# Patient Record
Sex: MALE
[De-identification: names, ages, dates, MRNs, and addresses within clinical notes are randomized per-mention and may not be internally consistent; named-entity substitution may affect disease eponyms.]

---

## 2018-06-11 PROBLEM — Z00.00 ENCOUNTER FOR PREVENTIVE HEALTH EXAMINATION: Status: ACTIVE | Noted: 2018-06-11

## 2018-07-06 ENCOUNTER — APPOINTMENT (OUTPATIENT)
Dept: NEUROLOGY | Facility: CLINIC | Age: 38
End: 2018-07-06
Payer: COMMERCIAL

## 2018-07-06 VITALS
SYSTOLIC BLOOD PRESSURE: 157 MMHG | WEIGHT: 208 LBS | DIASTOLIC BLOOD PRESSURE: 94 MMHG | HEART RATE: 63 BPM | HEIGHT: 69 IN | BODY MASS INDEX: 30.81 KG/M2

## 2018-07-06 DIAGNOSIS — Z83.3 FAMILY HISTORY OF DIABETES MELLITUS: ICD-10-CM

## 2018-07-06 PROCEDURE — 99205 OFFICE O/P NEW HI 60 MIN: CPT

## 2018-07-06 RX ORDER — LEVETIRACETAM 250 MG/1
250 TABLET, FILM COATED ORAL
Qty: 30 | Refills: 0 | Status: COMPLETED | COMMUNITY
Start: 2018-06-15

## 2018-07-06 RX ORDER — LEVETIRACETAM 500 MG/1
500 TABLET, FILM COATED ORAL
Qty: 60 | Refills: 0 | Status: COMPLETED | COMMUNITY
Start: 2018-06-29

## 2018-08-10 ENCOUNTER — APPOINTMENT (OUTPATIENT)
Dept: NEUROLOGY | Facility: CLINIC | Age: 38
End: 2018-08-10
Payer: COMMERCIAL

## 2018-08-10 ENCOUNTER — OTHER (OUTPATIENT)
Age: 38
End: 2018-08-10

## 2018-08-10 PROCEDURE — 95819 EEG AWAKE AND ASLEEP: CPT

## 2018-08-11 PROCEDURE — 95953: CPT

## 2018-08-12 PROCEDURE — 95953: CPT

## 2018-08-14 ENCOUNTER — OTHER (OUTPATIENT)
Age: 38
End: 2018-08-14

## 2018-08-31 ENCOUNTER — APPOINTMENT (OUTPATIENT)
Dept: NEUROLOGY | Facility: CLINIC | Age: 38
End: 2018-08-31
Payer: COMMERCIAL

## 2018-08-31 VITALS
BODY MASS INDEX: 30.81 KG/M2 | HEART RATE: 70 BPM | SYSTOLIC BLOOD PRESSURE: 150 MMHG | WEIGHT: 208 LBS | HEIGHT: 69 IN | DIASTOLIC BLOOD PRESSURE: 91 MMHG

## 2018-08-31 PROCEDURE — 99214 OFFICE O/P EST MOD 30 MIN: CPT

## 2019-02-15 ENCOUNTER — APPOINTMENT (OUTPATIENT)
Dept: NEUROLOGY | Facility: CLINIC | Age: 39
End: 2019-02-15
Payer: COMMERCIAL

## 2019-02-15 VITALS
HEART RATE: 71 BPM | HEIGHT: 69 IN | SYSTOLIC BLOOD PRESSURE: 164 MMHG | BODY MASS INDEX: 30.81 KG/M2 | DIASTOLIC BLOOD PRESSURE: 103 MMHG | WEIGHT: 208 LBS

## 2019-02-15 PROCEDURE — 99214 OFFICE O/P EST MOD 30 MIN: CPT

## 2019-02-15 NOTE — PHYSICAL EXAM
[FreeTextEntry1] : alert and oriented x 3, speech fluent, names easily, follows requests, good recall for recent and remote events.\par EOM full without sustained nystagmus, PERRL, face symmetrical, no dysarthria\par Motor - full strength in all extremities. normal rapid-alternating movements.\par Sensory - intact LT bilaterally\par Coord - no tremor, ataxia\par Gait - stands without difficulty, normal gait.

## 2019-02-15 NOTE — ASSESSMENT
[FreeTextEntry1] : Mr. NAVA presents with one clear convulsion from [place] 10, and one very likely convulsion (bitten tongue, bruised back and shoulder) from Jan 31.  EEG showed L temporal slowing. MRI was unremarkable.  Based on age - primary generalized seizure is unlikely, the occurrence of seizure out of sleep or possibly early morning raises possibilty of frontal lobe epilepsy. Mr. NAVA does not drive at present - he lives in [facility] and does not use car. He and his wife travel from [place] on [address] for appointment.\par Plan:\par \par 1. continue levetiracetam 500 q12.  if seizure/aura free at next visit, will decrease dose to 250 q12. We discussed that seizure recurrence risk may be approximately 50% in 2 years. (47% on epilepsypredictiontools.info)\par 2. lorazepam 1mg po as needed before or during airplane flights (traveling to [place] in late August)\par 3. RTC 6 mo - sooner if levetiracetam side effects or recurrent aura or seizure.\par \par Greater than 50% of the encounter time was spent on counseling and coordination of care for reviewing records in [employer], discussion with patient regarding plan. I have spent 25 minutes of face to face time with the patient reviewing the cause of seizures or seizure-like events, assessing the risk of recurrence, educating the patient or family to recognize seizures, and discussing possible treatment options and possible side effects of seizure medications. I also discussed seizure safety, and ways of reducing seizure risk.\par \par

## 2019-02-15 NOTE — HISTORY OF PRESENT ILLNESS
[FreeTextEntry1] : *** 02/15/2019 ***\par Mr. ENRIQUE NAVA returns for scheduled follow-up appointment. Mr. NAVA reports that in the interval since his last visit, he is doing well. No interval seizures or auras.  Mr. NAVA endorses minimal irritability - not clearly different than baseline.  Mr. NAVA also endorses that he has been more physically active and wondered whether that would impact on seizure risk. \par \par *** 08/31/2018 *** \par On this visit, Mr. NAVA recalls that in June prior to seizure he had 4-5 episodes of 1-3 minutes of feeling dizzy, gesturing to stomach. Since last visit, Mr. NAVA has had no recurrent events. He continues to take levetiracetam 500 q12, spouse has noted minimal irritability, mostly when already frustrated. No change in mood. \par \par *** 7/6/2018 ***\par On 6/10/18 Mr. NAVA had a seizure out of sleep, had w/u at [facility]. Spouse awoke to Mr. NAVA screaming and stiffened - convulsing.  May have had another event on Jan 31- woke up with "black tongue" and bruising on back. It was painful, and had difficulty eating.  Felt like he was "out of it" at the appointment. Spoke to his wife and told her there was  lapse of time and he was confused at doctor appointment.  Not sleep deprived, may have had a few beers over the prior weekend. \par \par No febrile seizures, no CNS infections. Normal development and schooling.\par Normal pregnancy.  \par \par Meds - took levetiracetam 500 mg twice a day. \par \par PMH - borderline HTN\par PSH - none\par Social - master's in special ed, no tobb, occasional ETOH, no drugs. \par FH - nc\par ROS - none

## 2019-06-06 ENCOUNTER — MEDICATION RENEWAL (OUTPATIENT)
Age: 39
End: 2019-06-06

## 2019-06-06 RX ORDER — LEVETIRACETAM 500 MG/1
500 TABLET, FILM COATED ORAL TWICE DAILY
Qty: 180 | Refills: 1 | Status: DISCONTINUED | COMMUNITY
Start: 1900-01-01 | End: 2019-06-06

## 2019-09-09 ENCOUNTER — APPOINTMENT (OUTPATIENT)
Dept: NEUROLOGY | Facility: CLINIC | Age: 39
End: 2019-09-09
Payer: COMMERCIAL

## 2019-09-09 VITALS
HEIGHT: 70 IN | WEIGHT: 208 LBS | SYSTOLIC BLOOD PRESSURE: 155 MMHG | HEART RATE: 67 BPM | BODY MASS INDEX: 29.78 KG/M2 | DIASTOLIC BLOOD PRESSURE: 91 MMHG

## 2019-09-09 PROCEDURE — 99214 OFFICE O/P EST MOD 30 MIN: CPT

## 2019-09-09 NOTE — HISTORY OF PRESENT ILLNESS
[FreeTextEntry1] : *** 09/09/2019  ***\par Mr. NAVA returns for follow-up.  He has been taking levetiracetam 250 q12 for nearly 3 mo without recurrence of seizures.  He has one lifetime convulsion. However, day prior to convulsion he had multiple episodes of dizziness and GI upset lasting 1-3 minutes each. Mr. NAVA has not had recurrence of these symptoms since taking levetiracetam. He now returns with his spouse to discuss pros / cons \par \par *** 02/15/2019 ***\par Mr. ENRIQUE NAVA returns for scheduled follow-up appointment. Mr. NAVA reports that in the interval since his last visit, he is doing well. No interval seizures or auras.  Mr. NAVA endorses minimal irritability - not clearly different than baseline.  Mr. NAVA also endorses that he has been more physically active and wondered whether that would impact on seizure risk. \par \par *** 08/31/2018 *** \par On this visit, Mr. NAVA recalls that in June prior to seizure he had 4-5 episodes of 1-3 minutes of feeling dizzy, gesturing to stomach. Since last visit, Mr. NAVA has had no recurrent events. He continues to take levetiracetam 500 q12, spouse has noted minimal irritability, mostly when already frustrated. No change in mood. \par \par *** 7/6/2018 ***\par On 6/10/18 Mr. NAVA had a seizure out of sleep, had w/u at [facility] in [place]. Spouse awoke to Mr. NAVA screaming and stiffened - convulsing.  May have had another event on Jan 31- woke up with "black tongue" and bruising on back. It was painful, and had difficulty eating.  Felt like he was "out of it" at the appointment. Spoke to his wife and told her there was  lapse of time and he was confused at doctor appointment.  Not sleep deprived, may have had a few beers over the prior weekend. \par \par No febrile seizures, no CNS infections. Normal development and schooling.\par Normal pregnancy.  \par \par Meds - took levetiracetam 500 mg twice a day. \par \par PMH - borderline HTN\par PSH - none\par Social - master's in special ed, no tobb, occasional ETOH, no drugs. \par FH - nc\par ROS - none

## 2019-09-26 ENCOUNTER — MEDICATION RENEWAL (OUTPATIENT)
Age: 39
End: 2019-09-26

## 2019-10-11 ENCOUNTER — APPOINTMENT (OUTPATIENT)
Dept: NEUROLOGY | Facility: CLINIC | Age: 39
End: 2019-10-11
Payer: COMMERCIAL

## 2019-10-11 PROCEDURE — 95816 EEG AWAKE AND DROWSY: CPT

## 2019-10-12 PROCEDURE — 95953: CPT

## 2019-10-13 PROCEDURE — 95953: CPT

## 2019-10-17 ENCOUNTER — OTHER (OUTPATIENT)
Age: 39
End: 2019-10-17

## 2020-10-16 ENCOUNTER — APPOINTMENT (OUTPATIENT)
Dept: NEUROLOGY | Facility: CLINIC | Age: 40
End: 2020-10-16
Payer: COMMERCIAL

## 2020-10-16 VITALS
SYSTOLIC BLOOD PRESSURE: 170 MMHG | BODY MASS INDEX: 28.49 KG/M2 | HEIGHT: 70 IN | DIASTOLIC BLOOD PRESSURE: 99 MMHG | HEART RATE: 59 BPM | WEIGHT: 199 LBS

## 2020-10-16 VITALS — TEMPERATURE: 97.1 F

## 2020-10-16 PROCEDURE — 99214 OFFICE O/P EST MOD 30 MIN: CPT

## 2020-11-01 RX ORDER — LEVETIRACETAM 250 MG/1
250 TABLET, FILM COATED ORAL TWICE DAILY
Qty: 60 | Refills: 5 | Status: DISCONTINUED | COMMUNITY
Start: 2019-06-06 | End: 2020-11-01

## 2020-11-01 RX ORDER — LORAZEPAM 1 MG/1
1 TABLET ORAL
Qty: 12 | Refills: 0 | Status: DISCONTINUED | COMMUNITY
Start: 2018-07-06 | End: 2020-11-01

## 2020-11-01 NOTE — ASSESSMENT
[FreeTextEntry1] : Mr. NAVA presents following recent recurrent convulsion from sleep.  Prior to this he had one clear convulsion from [place] 10 2019, and one very likely convulsion (bitten tongue, bruised back and shoulder) from Jan 31 2019.  EEG showed L temporal slowing. MRI was unremarkable.  Based on age - primary generalized seizure is unlikely, the occurrence of seizure out of sleep or possibly early morning raises possibilty of frontal lobe epilepsy. Mr. NAVA does not drive - he lives in [place] and does not use car. \par \par Plan:\par 1. re-start levetiracetam 500 q12 - Mr. NAVA was counselled on possible side effects including risk for irritability and depression. \par 2. f/u televisit in 6-12 mo\par \par I have spent 25 minutes of face to face time with the patient reviewing the cause of seizures or seizure-like events, assessing the risk of recurrence, educating the patient or family to recognize seizures, and discussing possible treatment options and possible side effects of seizure medications. I also discussed seizure safety, and ways of reducing seizure risk. Greater than 50% of the encounter time was spent on counseling and coordination of care for reviewing records in [employer], discussion with patient regarding plan.

## 2020-11-01 NOTE — HISTORY OF PRESENT ILLNESS
[FreeTextEntry1] : *** 10/16/2020  ***\par \par Mr. NAVA reports that he had an overnight convulsion - he had tapered off levetiracetam almost a year ago.  Mr. NAVA reports that he has been under increased stress and sleeping poorly.  He was re-started on levetiracetam 500 q12, and reports that he is tolerating it well. \par  \par *** 09/09/2019  ***\par Mr. NAVA returns for follow-up.  He has been taking levetiracetam 250 q12 for nearly 3 mo without recurrence of seizures.  He has one lifetime convulsion. However, day prior to convulsion he had multiple episodes of dizziness and GI upset lasting 1-3 minutes each. Mr. NAVA has not had recurrence of these symptoms since taking levetiracetam. He now returns with his spouse to discuss pros / cons \par \par *** 02/15/2019 ***\par Mr. ENRIQUE NAVA returns for scheduled follow-up appointment. Mr. NAVA reports that in the interval since his last visit, he is doing well. No interval seizures or auras.  Mr. NAVA endorses minimal irritability - not clearly different than baseline.  Mr. NAVA also endorses that he has been more physically active and wondered whether that would impact on seizure risk. \par \par *** 08/31/2018 *** \par On this visit, Mr. NAVA recalls that in June prior to seizure he had 4-5 episodes of 1-3 minutes of feeling dizzy, gesturing to stomach. Since last visit, Mr. NAVA has had no recurrent events. He continues to take levetiracetam 500 q12, spouse has noted minimal irritability, mostly when already frustrated. No change in mood. \par \par *** 7/6/2018 ***\par On 6/10/18 Mr. NAVA had a seizure out of sleep, had w/u at [facility] in [place]. Spouse awoke to Mr. NAVA screaming and stiffened - convulsing.  May have had another event on Jan 31- woke up with "black tongue" and bruising on back. It was painful, and had difficulty eating.  Felt like he was "out of it" at the appointment. Spoke to his wife and told her there was  lapse of time and he was confused at doctor appointment.  Not sleep deprived, may have had a few beers over the prior weekend. \par \par No febrile seizures, no CNS infections. Normal development and schooling.\par Normal pregnancy.  \par \par Meds - took levetiracetam 500 mg twice a day. \par \par PMH - borderline HTN\par PSH - none\par Social - master's in special ed, no tobb, occasional ETOH, no drugs. \par FH - nc\par ROS - none

## 2020-12-29 ENCOUNTER — NON-APPOINTMENT (OUTPATIENT)
Age: 40
End: 2020-12-29

## 2020-12-29 ENCOUNTER — TRANSCRIPTION ENCOUNTER (OUTPATIENT)
Age: 40
End: 2020-12-29

## 2021-01-15 ENCOUNTER — APPOINTMENT (OUTPATIENT)
Dept: NEUROLOGY | Facility: CLINIC | Age: 41
End: 2021-01-15
Payer: COMMERCIAL

## 2021-01-15 PROCEDURE — 99072 ADDL SUPL MATRL&STAF TM PHE: CPT

## 2021-01-15 PROCEDURE — 95819 EEG AWAKE AND ASLEEP: CPT

## 2021-01-16 PROCEDURE — 95708 EEG WO VID EA 12-26HR UNMNTR: CPT

## 2021-01-17 PROCEDURE — 95708 EEG WO VID EA 12-26HR UNMNTR: CPT

## 2021-01-17 PROCEDURE — 95721 EEG PHY/QHP>36<60 HR W/O VID: CPT

## 2021-01-17 PROCEDURE — 95700 EEG CONT REC W/VID EEG TECH: CPT

## 2021-01-17 PROCEDURE — 99072 ADDL SUPL MATRL&STAF TM PHE: CPT

## 2021-01-19 ENCOUNTER — APPOINTMENT (OUTPATIENT)
Dept: NEUROLOGY | Facility: CLINIC | Age: 41
End: 2021-01-19

## 2021-02-01 ENCOUNTER — APPOINTMENT (OUTPATIENT)
Dept: NEUROLOGY | Facility: CLINIC | Age: 41
End: 2021-02-01
Payer: COMMERCIAL

## 2021-02-01 DIAGNOSIS — G40.919 EPILEPSY, UNSPECIFIED, INTRACTABLE, W/OUT STATUS EPILEPTICUS: ICD-10-CM

## 2021-02-01 PROCEDURE — 99215 OFFICE O/P EST HI 40 MIN: CPT | Mod: 95

## 2021-02-01 RX ORDER — SODIUM FLUORIDE 6 MG/ML
1.1 PASTE, DENTIFRICE DENTAL
Qty: 100 | Refills: 0 | Status: ACTIVE | COMMUNITY
Start: 2020-12-12

## 2021-02-01 NOTE — ASSESSMENT
[FreeTextEntry1] : Mr. NAVA presents following recent recurrent convulsion from sleep while on levetiracetam.  Prior to this he had a convulsion in October in the setting of sleep deprivation after nearly 1 year seizure-free.  Prior to this, he had one clear convulsion from [place] 10 2019, and one very likely convulsion (bitten tongue, bruised back and shoulder) from Jan 31 2019.  EEG showed L temporal slowing. MRI was unremarkable.  Based on age - primary generalized seizure is unlikely, the occurrence of seizure out of sleep or possibly early morning raises possibility of frontal lobe epilepsy. Mr. NAVA does not drive - he lives in [place] and does not use car. \par \par Plan:\par 1.  After last convulsion, levetiracetam was changed to extended release 500 mg twice a day.\par 2.  I am concerned that Mr. Nava is having medication related toxicity, impacting irritability and mood.  We discussed changing medication, possibly to Vimpat or oxcarbazepine, depending on insurance coverage.  Mr. Nava is reluctant to make further changes, and we agreed to wait 1 month to assess side effects again.\par 3.  Follow-up televisit in 1 month to reassess levetiracetam side effects and consider alternative anticonvulsant medication.  Mr. Nava's wife is expecting a child in the summer, and I would like to make any medication changes in advance of that event\par 4.  Mr. NAVA requests copy of labs recently drawn. \par \par I have spent 40 minutes or longer reviewing patient data or discussing with the patient  the cause of seizures or seizure-like events and comorbid conditions, assessing the risk of recurrence, educating the patient or family to recognize seizures, and discussing possible treatment options for seizures and comorbid conditions and possible side effects of medications. I also discussed seizure safety, and ways of reducing seizure risk. Greater than 50% of the encounter time was spent on counseling and coordination of care for reviewing records in [employer], discussion with patient regarding plan.

## 2021-02-01 NOTE — REASON FOR VISIT
[Home] : at home, [unfilled] , at the time of the visit. [Other Location: e.g. Home (Enter Location, City,State)___] : at [unfilled] [Follow-Up: _____] : a [unfilled] follow-up visit [Spouse] : spouse

## 2021-02-01 NOTE — PHYSICAL EXAM
[FreeTextEntry1] : Alert and oriented x 3, speech fluent, names easily, follows requests, good recall for recent and remote events.\par EOM full without sustained nystagmus, PERRL, face symmetrical, no dysarthria\par Motor - symmetric strength. normal rapid-alternating movements.\par Sensory - intact LT bilaterally\par Coord - no tremor, ataxia\par Gait - stands without difficulty, normal gait.

## 2021-02-01 NOTE — HISTORY OF PRESENT ILLNESS
[FreeTextEntry1] : *** 02/01/2021  ***\par Mr. NAVA had another convulsion that occurred in early morning, about 6a, normally takes meds at 7a/7p.  He does not think he missed any doses.  His spouse noted that 1 day prior to the conversion, he may have had a complex partial seizure.  He was sitting on the couch, was unresponsive for a short time and his wife noted he was staring, and not processing information.  Spouse has also been noticing more irritability and anger, and that Mr. NAVA has more difficulty letting go of anger, often anger seems unprompted. \par \par *** 10/16/2020  ***\par Mr. NAVA reports that he had an overnight convulsion - he had tapered off levetiracetam almost a year ago.  Mr. NAVA reports that he has been under increased stress and sleeping poorly.  He was re-started on levetiracetam 500 q12, and reports that he is tolerating it well. \par  \par *** 09/09/2019  ***\par Mr. NAVA returns for follow-up.  He has been taking levetiracetam 250 q12 for nearly 3 mo without recurrence of seizures.  He has one lifetime convulsion. However, day prior to convulsion he had multiple episodes of dizziness and GI upset lasting 1-3 minutes each. Mr. NAVA has not had recurrence of these symptoms since taking levetiracetam. He now returns with his spouse to discuss pros / cons \par \par *** 02/15/2019 ***\par Mr. ENRIQUE NAVA returns for scheduled follow-up appointment. Mr. NAVA reports that in the interval since his last visit, he is doing well. No interval seizures or auras.  Mr. NAVA endorses minimal irritability - not clearly different than baseline.  Mr. NAVA also endorses that he has been more physically active and wondered whether that would impact on seizure risk. \par \par *** 08/31/2018 *** \par On this visit, Mr. NAVA recalls that in June prior to seizure he had 4-5 episodes of 1-3 minutes of feeling dizzy, gesturing to stomach. Since last visit, Mr. NAVA has had no recurrent events. He continues to take levetiracetam 500 q12, spouse has noted minimal irritability, mostly when already frustrated. No change in mood. \par \par *** 7/6/2018 ***\par On 6/10/18 Mr. NAVA had a seizure out of sleep, had w/u at [facility] in [place]. Spouse awoke to Mr. NAVA screaming and stiffened - convulsing.  May have had another event on Jan 31- woke up with "black tongue" and bruising on back. It was painful, and had difficulty eating.  Felt like he was "out of it" at the appointment. Spoke to his wife and told her there was  lapse of time and he was confused at doctor appointment.  Not sleep deprived, may have had a few beers over the prior weekend. \par \par No febrile seizures, no CNS infections. Normal development and schooling.\par Normal pregnancy.  \par \par Meds - took levetiracetam 500 mg twice a day. \par \par PMH - borderline HTN\par PSH - none\par Social - master's in special ed, no tobb, occasional ETOH, no drugs. \par FH - nc\par ROS - none

## 2021-02-03 ENCOUNTER — TRANSCRIPTION ENCOUNTER (OUTPATIENT)
Age: 41
End: 2021-02-03

## 2021-02-05 ENCOUNTER — TRANSCRIPTION ENCOUNTER (OUTPATIENT)
Age: 41
End: 2021-02-05

## 2021-03-02 ENCOUNTER — RX RENEWAL (OUTPATIENT)
Age: 41
End: 2021-03-02

## 2021-03-05 ENCOUNTER — APPOINTMENT (OUTPATIENT)
Dept: NEUROLOGY | Facility: CLINIC | Age: 41
End: 2021-03-05

## 2021-03-22 ENCOUNTER — APPOINTMENT (OUTPATIENT)
Dept: NEUROLOGY | Facility: CLINIC | Age: 41
End: 2021-03-22

## 2021-04-30 RX ORDER — LEVETIRACETAM 500 MG/1
500 TABLET, FILM COATED ORAL TWICE DAILY
Qty: 180 | Refills: 1 | Status: DISCONTINUED | COMMUNITY
Start: 2020-10-14 | End: 2021-04-30

## 2021-07-06 ENCOUNTER — TRANSCRIPTION ENCOUNTER (OUTPATIENT)
Age: 41
End: 2021-07-06

## 2021-07-07 ENCOUNTER — TRANSCRIPTION ENCOUNTER (OUTPATIENT)
Age: 41
End: 2021-07-07

## 2021-07-07 ENCOUNTER — APPOINTMENT (OUTPATIENT)
Dept: NEUROLOGY | Facility: CLINIC | Age: 41
End: 2021-07-07
Payer: COMMERCIAL

## 2021-07-07 PROCEDURE — 99213 OFFICE O/P EST LOW 20 MIN: CPT | Mod: 95

## 2021-07-07 RX ORDER — LEVETIRACETAM 500 MG/1
500 TABLET, FILM COATED, EXTENDED RELEASE ORAL
Qty: 60 | Refills: 5 | Status: DISCONTINUED | COMMUNITY
Start: 2021-01-04 | End: 2021-07-07

## 2021-07-08 NOTE — HISTORY OF PRESENT ILLNESS
[FreeTextEntry1] : ***UPDATE: 7/7/2021***\par Appointment was conducted by audiovisual/telehealth at request of patient in place of a follow up office visit due to heightened concern for Corona virus infection risk.\par Verbal consent given on Jul 08, 2021 2:18 PM by the patient Mr. ENRIQUE NAVA May  5 1980 who understands that the telephone visit will be charged to insurance and may involve co-pay for patient\par Nurse Practitioner location:office\par Patient location:home\par Individuals on call: CARL Khan, Mr. ENRIQUE NAVA\par \par Mr NELSON NAVA reports a recent seizure yesterday morning at 4:58 am. He took an extra dose of Levetiracetam ER 500mg\par Of note he and his wife just had a baby girl  one month ago and he has been very sleep deprived which probably triggered recent episode\par Otherwise his mood is good and he has no side effects\par \par Levetiracetam ER 500mg BID\par \par \par *** 02/01/2021  ***\par Mr. NAVA had another convulsion that occurred in early morning, about 6a, normally takes meds at 7a/7p.  He does not think he missed any doses.  His spouse noted that 1 day prior to the conversion, he may have had a complex partial seizure.  He was sitting on the couch, was unresponsive for a short time and his wife noted he was staring, and not processing information.  Spouse has also been noticing more irritability and anger, and that Mr. NAVA has more difficulty letting go of anger, often anger seems unprompted. \par \par *** 10/16/2020  ***\par Mr. NAVA reports that he had an overnight convulsion - he had tapered off levetiracetam almost a year ago.  Mr. NAVA reports that he has been under increased stress and sleeping poorly.  He was re-started on levetiracetam 500 q12, and reports that he is tolerating it well. \par  \par *** 09/09/2019  ***\par Mr. NAVA returns for follow-up.  He has been taking levetiracetam 250 q12 for nearly 3 mo without recurrence of seizures.  He has one lifetime convulsion. However, day prior to convulsion he had multiple episodes of dizziness and GI upset lasting 1-3 minutes each. Mr. NAVA has not had recurrence of these symptoms since taking levetiracetam. He now returns with his spouse to discuss pros / cons \par \par *** 02/15/2019 ***\par Mr. ENRIQUE NAVA returns for scheduled follow-up appointment. Mr. NAVA reports that in the interval since his last visit, he is doing well. No interval seizures or auras.  Mr. NAVA endorses minimal irritability - not clearly different than baseline.  Mr. NAVA also endorses that he has been more physically active and wondered whether that would impact on seizure risk. \par \par *** 08/31/2018 *** \par On this visit, Mr. NAVA recalls that in June prior to seizure he had 4-5 episodes of 1-3 minutes of feeling dizzy, gesturing to stomach. Since last visit, Mr. NAVA has had no recurrent events. He continues to take levetiracetam 500 q12, spouse has noted minimal irritability, mostly when already frustrated. No change in mood. \par \par *** 7/6/2018 ***\par On 6/10/18 Mr. NAVA had a seizure out of sleep, had w/u at [facility] in [place]. Spouse awoke to Mr. NAVA screaming and stiffened - convulsing.  May have had another event on Jan 31- woke up with "black tongue" and bruising on back. It was painful, and had difficulty eating.  Felt like he was "out of it" at the appointment. Spoke to his wife and told her there was  lapse of time and he was confused at doctor appointment.  Not sleep deprived, may have had a few beers over the prior weekend. \par \par No febrile seizures, no CNS infections. Normal development and schooling.\par Normal pregnancy.  \par \par Meds - took levetiracetam 500 mg twice a day. \par \par PMH - borderline HTN\par PSH - none\par Social - master's in special ed, no tobb, occasional ETOH, no drugs. \par FH - nc\par ROS - none

## 2021-07-08 NOTE — REASON FOR VISIT
[Follow-Up: _____] : a [unfilled] follow-up visit [Home] : at home, [unfilled] , at the time of the visit. [Other Location: e.g. Home (Enter Location, City,State)___] : at [unfilled] [Spouse] : spouse

## 2021-07-08 NOTE — ASSESSMENT
[FreeTextEntry1] : Mr. NAVA presents following recent recurrent convulsion from sleep while on levetiracetam.  Prior to this he had a convulsion in October in the setting of sleep deprivation after nearly 1 year seizure-free.  Prior to this, he had one clear convulsion from [place] 10 2019, and one very likely convulsion (bitten tongue, bruised back and shoulder) from Jan 31 2019.  EEG showed L temporal slowing. MRI was unremarkable.  Based on age - primary generalized seizure is unlikely, the occurrence of seizure out of sleep or possibly early morning raises possibility of frontal lobe epilepsy. Mr. NAVA does not drive - he lives in [place] and does not use car. \par \par Plan:\par -increase Levetiracetam ER 500mg BID to 750mg BID **at least temporarily over the next few months until baby   has a more regular sleeping pattern ( patient agreed)\par - good sleep hygiene info mailed to patient\par - check Levetiracetam levels in 2 weeks\par - follow up in 3 months call if any breakthrough events\par

## 2021-07-13 ENCOUNTER — TRANSCRIPTION ENCOUNTER (OUTPATIENT)
Age: 41
End: 2021-07-13

## 2021-07-19 ENCOUNTER — TRANSCRIPTION ENCOUNTER (OUTPATIENT)
Age: 41
End: 2021-07-19

## 2021-07-23 ENCOUNTER — APPOINTMENT (OUTPATIENT)
Dept: NEUROLOGY | Facility: CLINIC | Age: 41
End: 2021-07-23
Payer: COMMERCIAL

## 2021-07-23 PROCEDURE — 99215 OFFICE O/P EST HI 40 MIN: CPT | Mod: 95

## 2021-08-07 NOTE — REASON FOR VISIT
[Home] : at home, [unfilled] , at the time of the visit. [Medical Office: ([facility])___] : at the medical office located in  [Spouse] : spouse [Follow-Up: _____] : a [unfilled] follow-up visit

## 2021-08-07 NOTE — ASSESSMENT
[FreeTextEntry1] : Mr. NAVA presents following recent recurrent convulsion from sleep while on levetiracetam.  Prior to this he had a convulsion in October in the setting of sleep deprivation after nearly 1 year seizure-free.  Prior to this, he had one clear convulsion from [place] 10 2019, and one very likely convulsion (bitten tongue, bruised back and shoulder) from Jan 31 2019.  EEG showed L temporal slowing. MRI was unremarkable.  Based on age - primary generalized seizure is unlikely, the occurrence of seizure out of sleep or possibly early morning raises possibility of frontal lobe epilepsy. Spouse describes event suggestive of partial seizure evening before nocturnal convulsion out of sleep.  Mr. NAVA does not drive - he lives in [place] and does not use car. \par \par Plan:\par 1.  After last convulsion, levetiracetam was changed to extended release 750 mg twice a day - I suggested we try 500/1000 ER. \par 2.  Irritabilty resolved on switch to ER, need to monitor and see if it is problem at higher dose of levetiracetam\par 3.  Follow-up televisit in 2 month to reassess levetiracetam side effects\par 4.  Mr. NAVA will forward copy of labs recently drawn. \par \par I have spent 40 minutes or longer reviewing patient data or discussing with the patient  the cause of seizures or seizure-like events and comorbid conditions, assessing the risk of recurrence, educating the patient or family to recognize seizures, and discussing possible treatment options for seizures and comorbid conditions and possible side effects of medications. I also discussed seizure safety, and ways of reducing seizure risk. Greater than 50% of the encounter time was spent on counseling and coordination of care for reviewing records in [employer], discussion with patient regarding plan.

## 2021-08-07 NOTE — HISTORY OF PRESENT ILLNESS
[FreeTextEntry1] : *** 07/23/2021  ***\par Mr. NAVA had another seizure July 6 - occurred at 5a.  He has had only seizures out of sleep except last year when not taking medication, he had a seizure mid-afternoon. Mr. NAVA has new baby at home - and had been getting up overnight to give baby feeds. Irritability improved with change to ER.  \par \par ***UPDATE: 7/7/2021***\par Appointment was conducted by audiovisual/telehealth at request of patient in place of a follow up office visit due to heightened concern for Corona virus infection risk.\par Verbal consent given on Jul 08, 2021 2:18 PM by the patient Mr. ENRIQUE NAVA May 5 1980 who understands that the telephone visit will be charged to insurance and may involve co-pay for patient\par Nurse Practitioner location:office\par Patient location:home\par Individuals on call: CARL Khan, Mr. ENRIQUE NAVA\par \par Mr NELSON NAVA reports a recent seizure yesterday morning at 4:58 am. He took an extra dose of Levetiracetam ER 500mg\par Of note he and his wife just had a baby girl one month ago and he has been very sleep deprived which probably triggered recent episode\par Otherwise his mood is good and he has no side effects\par \par Levetiracetam ER 500mg BID\par \par *** 02/01/2021  ***\par Mr. NAVA had another convulsion that occurred in early morning, about 6a, normally takes meds at 7a/7p.  He does not think he missed any doses.  His spouse noted that 1 day prior to the conversion, he may have had a complex partial seizure.  He was sitting on the couch, was unresponsive for a short time and his wife noted he was staring, and not processing information.  Spouse has also been noticing more irritability and anger, and that Mr. NAVA has more difficulty letting go of anger, often anger seems unprompted. \par \par *** 10/16/2020  ***\par Mr. NAVA reports that he had an overnight convulsion - he had tapered off levetiracetam almost a year ago.  Mr. NAVA reports that he has been under increased stress and sleeping poorly.  He was re-started on levetiracetam 500 q12, and reports that he is tolerating it well. \par  \par *** 09/09/2019  ***\par Mr. NAVA returns for follow-up.  He has been taking levetiracetam 250 q12 for nearly 3 mo without recurrence of seizures.  He has one lifetime convulsion. However, day prior to convulsion he had multiple episodes of dizziness and GI upset lasting 1-3 minutes each. Mr. NAVA has not had recurrence of these symptoms since taking levetiracetam. He now returns with his spouse to discuss pros / cons \par \par *** 02/15/2019 ***\par Mr. ENRIQUE NAVA returns for scheduled follow-up appointment. Mr. NAVA reports that in the interval since his last visit, he is doing well. No interval seizures or auras.  Mr. NAVA endorses minimal irritability - not clearly different than baseline.  Mr. NAVA also endorses that he has been more physically active and wondered whether that would impact on seizure risk. \par \par *** 08/31/2018 *** \par On this visit, Mr. NAVA recalls that in June prior to seizure he had 4-5 episodes of 1-3 minutes of feeling dizzy, gesturing to stomach. Since last visit, Mr. NAVA has had no recurrent events. He continues to take levetiracetam 500 q12, spouse has noted minimal irritability, mostly when already frustrated. No change in mood. \par \par *** 7/6/2018 ***\par On 6/10/18 Mr. NAVA had a seizure out of sleep, had w/u at [facility] in [place]. Spouse awoke to Mr. NAVA screaming and stiffened - convulsing.  May have had another event on Jan 31- woke up with "black tongue" and bruising on back. It was painful, and had difficulty eating.  Felt like he was "out of it" at the appointment. Spoke to his wife and told her there was  lapse of time and he was confused at doctor appointment.  Not sleep deprived, may have had a few beers over the prior weekend. \par \par No febrile seizures, no CNS infections. Normal development and schooling.\par Normal pregnancy.  \par \par Meds - took levetiracetam 500 mg twice a day. \par \par PMH - borderline HTN\par PSH - none\par Social - master's in special ed, no tobb, occasional ETOH, no drugs. \par FH - nc\par ROS - none

## 2021-10-05 ENCOUNTER — APPOINTMENT (OUTPATIENT)
Dept: NEUROLOGY | Facility: CLINIC | Age: 41
End: 2021-10-05
Payer: COMMERCIAL

## 2021-10-05 PROCEDURE — 99214 OFFICE O/P EST MOD 30 MIN: CPT | Mod: 95

## 2021-10-06 NOTE — ASSESSMENT
[FreeTextEntry1] : Mr. NAVA presents following recent recurrent convulsion from sleep while on levetiracetam.  Prior to this he had a convulsion in October in the setting of sleep deprivation after nearly 1 year seizure-free.  Prior to this, he had one clear convulsion from [place] 10 2019, and one very likely convulsion (bitten tongue, bruised back and shoulder) from Jan 31 2019.  EEG showed L temporal slowing. MRI was unremarkable.  Based on age - primary generalized seizure is unlikely, the occurrence of seizure out of sleep or possibly early morning raises possibility of frontal lobe epilepsy. Spouse describes event suggestive of partial seizure evening before nocturnal convulsion out of sleep.  Mr. NAVA does not drive - he lives in [place] and does not use car. \par \par Plan:\par 1.  After last convulsion, levetiracetam was changed to 500/1000 ER. \par 2.  Irritabilty resolved on switch to ER, we will continue to monitor.\par 3.  Follow-up televisit in 6 month to reassess levetiracetam side effects\par 4.  Mr. NAVA will forward copy of labs drawn this past summer\par \par I have spent 30 minutes or longer reviewing patient data or discussing with the patient  the cause of seizures or seizure-like events and comorbid conditions, assessing the risk of recurrence, educating the patient or family to recognize seizures, and discussing possible treatment options for seizures and comorbid conditions and possible side effects of medications. I also discussed seizure safety, and ways of reducing seizure risk. Greater than 50% of the encounter time was spent on counseling and coordination of care for reviewing records in [employer], discussion with patient regarding plan.

## 2021-10-06 NOTE — HISTORY OF PRESENT ILLNESS
[FreeTextEntry1] : *** 10/05/2021  ***\par  Mr. NAVA returns for scheduled follow-up visit.  In the interval, he reports no recurrent seizures.  This follow-up was primarily arranged to assess for medication side effects. Mr. NAVA is taking levetiracetam  mg in the morning, 1000 mg at bedtime.  He and his spouse report no changes in irritability.  He reports that his mood is good.  He has a small infant at home, sleep has been patchy, but his seizure control has been adequate.\par \par *** 07/23/2021  ***\par Mr. NAVA had another seizure July 6 - occurred at 5a.  He has had only seizures out of sleep except last year when not taking medication, he had a seizure mid-afternoon. Mr. NAVA has new baby at home - and had been getting up overnight to give baby feeds. Irritability improved with change to ER.  \par \par ***UPDATE: 7/7/2021***\par Appointment was conducted by audiovisual/telehealth at request of patient in place of a follow up office visit due to heightened concern for Corona virus infection risk.\par Verbal consent given on Jul 08, 2021 2:18 PM by the patient Mr. ENRIQUE NAVA May 5 1980 who understands that the telephone visit will be charged to insurance and may involve co-pay for patient\par Nurse Practitioner location:office\par Patient location:home\par Individuals on call: CARL Khan, Mr. ENRIQUE NAVA\par \par Mr NELSON NAVA reports a recent seizure yesterday morning at 4:58 am. He took an extra dose of Levetiracetam ER 500mg\par Of note he and his wife just had a baby girl one month ago and he has been very sleep deprived which probably triggered recent episode\par Otherwise his mood is good and he has no side effects\par \par Levetiracetam ER 500mg BID\par \par *** 02/01/2021  ***\par Mr. NAVA had another convulsion that occurred in early morning, about 6a, normally takes meds at 7a/7p.  He does not think he missed any doses.  His spouse noted that 1 day prior to the conversion, he may have had a complex partial seizure.  He was sitting on the couch, was unresponsive for a short time and his wife noted he was staring, and not processing information.  Spouse has also been noticing more irritability and anger, and that Mr. NAVA has more difficulty letting go of anger, often anger seems unprompted. \par \par *** 10/16/2020  ***\par Mr. NAVA reports that he had an overnight convulsion - he had tapered off levetiracetam almost a year ago.  Mr. NAVA reports that he has been under increased stress and sleeping poorly.  He was re-started on levetiracetam 500 q12, and reports that he is tolerating it well. \par  \par *** 09/09/2019  ***\par Mr. NAVA returns for follow-up.  He has been taking levetiracetam 250 q12 for nearly 3 mo without recurrence of seizures.  He has one lifetime convulsion. However, day prior to convulsion he had multiple episodes of dizziness and GI upset lasting 1-3 minutes each. Mr. NAVA has not had recurrence of these symptoms since taking levetiracetam. He now returns with his spouse to discuss pros / cons \par \par *** 02/15/2019 ***\par Mr. ENRIQUE NAVA returns for scheduled follow-up appointment. Mr. NAVA reports that in the interval since his last visit, he is doing well. No interval seizures or auras.  Mr. NAVA endorses minimal irritability - not clearly different than baseline.  Mr. NAVA also endorses that he has been more physically active and wondered whether that would impact on seizure risk. \par \par *** 08/31/2018 *** \par On this visit, Mr. NAVA recalls that in June prior to seizure he had 4-5 episodes of 1-3 minutes of feeling dizzy, gesturing to stomach. Since last visit, Mr. NAVA has had no recurrent events. He continues to take levetiracetam 500 q12, spouse has noted minimal irritability, mostly when already frustrated. No change in mood. \par \par *** 7/6/2018 ***\par On 6/10/18 Mr. NAVA had a seizure out of sleep, had w/u at [facility] in [place]. Spouse awoke to Mr. NAVA screaming and stiffened - convulsing.  May have had another event on Jan 31- woke up with "black tongue" and bruising on back. It was painful, and had difficulty eating.  Felt like he was "out of it" at the appointment. Spoke to his wife and told her there was  lapse of time and he was confused at doctor appointment.  Not sleep deprived, may have had a few beers over the prior weekend. \par \par No febrile seizures, no CNS infections. Normal development and schooling.\par Normal pregnancy.  \par \par Meds - took levetiracetam 500 mg twice a day. \par \par PMH - borderline HTN\par PSH - none\par Social - master's in special ed, no tobb, occasional ETOH, no drugs. \par FH - nc\par ROS - none

## 2021-11-29 ENCOUNTER — TRANSCRIPTION ENCOUNTER (OUTPATIENT)
Age: 41
End: 2021-11-29

## 2021-11-30 ENCOUNTER — APPOINTMENT (OUTPATIENT)
Dept: NEUROLOGY | Facility: CLINIC | Age: 41
End: 2021-11-30
Payer: COMMERCIAL

## 2021-11-30 PROCEDURE — 99215 OFFICE O/P EST HI 40 MIN: CPT | Mod: 95

## 2021-11-30 NOTE — ASSESSMENT
[FreeTextEntry1] : Mr. NAVA presents following recent recurrent convulsion from sleep while on levetiracetam.  Prior to this he had a convulsion in October in the setting of sleep deprivation after nearly 1 year seizure-free.  Prior to this, he had one clear convulsion from [place] 10 2019, and one very likely convulsion (bitten tongue, bruised back and shoulder) from Jan 31 2019.  EEG showed L temporal slowing. MRI in 2018 was unremarkable.  Based on age - primary generalized seizure is unlikely, the occurrence of seizure out of sleep or possibly early morning raises possibility of frontal lobe epilepsy. Spouse describes event suggestive of partial seizure evening before nocturnal convulsion out of sleep.  Mr. NAVA does not drive - he lives in [place] and does not use car. \par \par Plan:\par 1.  After last convulsion, levetiracetam was changed to extended release 500/1000 ER.  I recommended increasing dose to 1000 mg twice a day.\par 2.  If  Mr. NAVA experiences recurrent seizure, I will recommend transition to Xcopri.\par 3.  Follow-up televisit in 2 month to reassess levetiracetam side effects\par \par I have spent 40 minutes or longer reviewing patient data or discussing with the patient  the cause of seizures or seizure-like events and comorbid conditions, assessing the risk of recurrence, educating the patient or family to recognize seizures, and discussing possible treatment options for seizures and comorbid conditions and possible side effects of medications. I also discussed seizure safety, and ways of reducing seizure risk. Greater than 50% of the encounter time was spent on counseling and coordination of care for reviewing records in [employer], discussion with patient regarding plan.

## 2021-11-30 NOTE — HISTORY OF PRESENT ILLNESS
[Home] : at home, [unfilled] , at the time of the visit. [Medical Office: ([facility])___] : at the medical office located in  [Spouse] : spouse [FreeTextEntry1] : *** 11/30/2021  ***\par  Mr. NAVA returns for unscheduled visit as a result of recent recurrent seizure. Mr. NAVA described that he had a poor night's sleep, took his usual medications at 7 AM, went back to sleep around 9 AM, and woke up around 10:30 AM with blood in his mouth and tongue bitten.  His wife describes him as confused that morning.  He concludes that he must of had a seizure unwitnessed by his wife.\par \par  Mr. NAVA and his wife also shared that their current plan is for her to return to work in the summer 2022, and Mr. NAVA will take on full responsibilities as primary caretaker for his infant child.  They are feeling some pressure to have his seizures under good control prior to this transition.\par \par *** 10/05/2021  ***\par  Mr. NAVA returns for scheduled follow-up visit.  In the interval, he reports no recurrent seizures.  This follow-up was primarily arranged to assess for medication side effects. Mr. NAVA is taking levetiracetam  mg in the morning, 1000 mg at bedtime.  He and his spouse report no changes in irritability.  He reports that his mood is good.  He has a small infant at home, sleep has been patchy, but his seizure control has been adequate.\par \par *** 07/23/2021  ***\par Mr. NAVA had another seizure July 6 - occurred at 5a.  He has had only seizures out of sleep except last year when not taking medication, he had a seizure mid-afternoon. Mr. NAVA has new baby at home - and had been getting up overnight to give baby feeds. Irritability improved with change to ER.  \par \par ***UPDATE: 7/7/2021***\par Appointment was conducted by audiovisual/telehealth at request of patient in place of a follow up office visit due to heightened concern for Corona virus infection risk.\par Verbal consent given on Jul 08, 2021 2:18 PM by the patient Mr. ENRIQUE NAVA May 5 1980 who understands that the telephone visit will be charged to insurance and may involve co-pay for patient\par Nurse Practitioner location:office\par Patient location:home\par Individuals on call: Fozia Dickson, [facility], Mr. ENRIQUE NAVA\par \par Mr NELSON NAVA reports a recent seizure yesterday morning at 4:58 am. He took an extra dose of Levetiracetam ER 500mg\par Of note he and his wife just had a baby girl one month ago and he has been very sleep deprived which probably triggered recent episode\par Otherwise his mood is good and he has no side effects\par \par Levetiracetam ER 500mg BID\par \par *** 02/01/2021  ***\par Mr. NAVA had another convulsion that occurred in early morning, about 6a, normally takes meds at 7a/7p.  He does not think he missed any doses.  His spouse noted that 1 day prior to the conversion, he may have had a complex partial seizure.  He was sitting on the couch, was unresponsive for a short time and his wife noted he was staring, and not processing information.  Spouse has also been noticing more irritability and anger, and that Mr. NAVA has more difficulty letting go of anger, often anger seems unprompted. \par \par *** 10/16/2020  ***\par Mr. NAVA reports that he had an overnight convulsion - he had tapered off levetiracetam almost a year ago.  Mr. NAVA reports that he has been under increased stress and sleeping poorly.  He was re-started on levetiracetam 500 q12, and reports that he is tolerating it well. \par  \par *** 09/09/2019  ***\par Mr. NAVA returns for follow-up.  He has been taking levetiracetam 250 q12 for nearly 3 mo without recurrence of seizures.  He has one lifetime convulsion. However, day prior to convulsion he had multiple episodes of dizziness and GI upset lasting 1-3 minutes each. Mr. NAVA has not had recurrence of these symptoms since taking levetiracetam. He now returns with his spouse to discuss pros / cons \par \par *** 02/15/2019 ***\par Mr. ENRIQUE NAVA returns for scheduled follow-up appointment. Mr. NAVA reports that in the interval since his last visit, he is doing well. No interval seizures or auras.  Mr. NAVA endorses minimal irritability - not clearly different than baseline.  Mr. NAVA also endorses that he has been more physically active and wondered whether that would impact on seizure risk. \par \par *** 08/31/2018 *** \par On this visit, Mr. NAVA recalls that in June prior to seizure he had 4-5 episodes of 1-3 minutes of feeling dizzy, gesturing to stomach. Since last visit, Mr. NAVA has had no recurrent events. He continues to take levetiracetam 500 q12, spouse has noted minimal irritability, mostly when already frustrated. No change in mood. \par \par *** 7/6/2018 ***\par On 6/10/18 Mr. NAVA had a seizure out of sleep, had w/u at [facility] in [place]. Spouse awoke to Mr. NAVA screaming and stiffened - convulsing.  May have had another event on Jan 31- woke up with "black tongue" and bruising on back. It was painful, and had difficulty eating.  Felt like he was "out of it" at the appointment. Spoke to his wife and told her there was  lapse of time and he was confused at doctor appointment.  Not sleep deprived, may have had a few beers over the prior weekend. \par \par No febrile seizures, no CNS infections. Normal development and schooling.\par Normal pregnancy.  \par \par Meds - took levetiracetam 500 mg twice a day. \par \par PMH - borderline HTN\par PSH - none\par Social - master's in special ed, no tobb, occasional ETOH, no drugs. \par FH - nc\par ROS - none

## 2021-12-08 ENCOUNTER — TRANSCRIPTION ENCOUNTER (OUTPATIENT)
Age: 41
End: 2021-12-08

## 2021-12-11 ENCOUNTER — OUTPATIENT (OUTPATIENT)
Dept: OUTPATIENT SERVICES | Facility: HOSPITAL | Age: 41
LOS: 1 days | End: 2021-12-11

## 2021-12-11 ENCOUNTER — APPOINTMENT (OUTPATIENT)
Dept: MRI IMAGING | Facility: CLINIC | Age: 41
End: 2021-12-11
Payer: COMMERCIAL

## 2021-12-11 PROCEDURE — 70551 MRI BRAIN STEM W/O DYE: CPT | Mod: 26

## 2021-12-13 ENCOUNTER — RX RENEWAL (OUTPATIENT)
Age: 41
End: 2021-12-13

## 2021-12-16 ENCOUNTER — TRANSCRIPTION ENCOUNTER (OUTPATIENT)
Age: 41
End: 2021-12-16

## 2021-12-28 ENCOUNTER — TRANSCRIPTION ENCOUNTER (OUTPATIENT)
Age: 41
End: 2021-12-28

## 2022-01-10 ENCOUNTER — TRANSCRIPTION ENCOUNTER (OUTPATIENT)
Age: 42
End: 2022-01-10

## 2022-02-18 ENCOUNTER — APPOINTMENT (OUTPATIENT)
Dept: NEUROLOGY | Facility: CLINIC | Age: 42
End: 2022-02-18
Payer: COMMERCIAL

## 2022-02-18 PROCEDURE — 99214 OFFICE O/P EST MOD 30 MIN: CPT | Mod: 95

## 2022-02-18 RX ORDER — LEVETIRACETAM 500 MG/1
500 TABLET, FILM COATED, EXTENDED RELEASE ORAL TWICE DAILY
Qty: 360 | Refills: 1 | Status: DISCONTINUED | COMMUNITY
Start: 2021-07-07 | End: 2022-02-18

## 2022-02-18 NOTE — ASSESSMENT
[FreeTextEntry1] : Mr. NAVA presents following recent recurrent convulsion from sleep while on levetiracetam.  Prior to this he had a convulsion in October in the setting of sleep deprivation after nearly 1 year seizure-free.  Prior to this, he had one clear convulsion from [place] 10 2019, and one very likely convulsion (bitten tongue, bruised back and shoulder) from Jan 31 2019.  EEG showed L temporal slowing. MRI was unremarkable.  Based on age - primary generalized seizure is unlikely, the occurrence of seizure out of sleep or possibly early morning raises possibility of frontal lobe epilepsy. Spouse describes event suggestive of partial seizure evening before nocturnal convulsion out of sleep.  Mr. NAVA does not drive - he lives in [place] and does not use car. \par \par Plan:\par 1.  Continue levetiracetam 1000 mg ER twice a day\par 2.  Irritabilty has not been a concern\par 3.  Follow-up televisit in 6 month \par 4.  We discussed seizure safety, including driving, and caring for infant at home–precautions include moving changing table to floor, not bathing baby in bathtub, unless spouse is present, avoiding stairs while holding baby, etc.\par \par I have spent 30 minutes or longer reviewing patient data or discussing with the patient  the cause of seizures or seizure-like events and comorbid conditions, assessing the risk of recurrence, educating the patient or family to recognize seizures, and discussing possible treatment options for seizures and comorbid conditions and possible side effects of medications. I also discussed seizure safety, and ways of reducing seizure risk. Greater than 50% of the encounter time was spent on counseling and coordination of care for reviewing records in [employer], discussion with patient regarding plan.

## 2022-02-18 NOTE — HISTORY OF PRESENT ILLNESS
[FreeTextEntry1] : *** 02/18/2022  ***\par Mr. Branch returns for regular follow-up.  He reports that he is doing well on Keppra ER 1000 mg twice a day.  He has had no interval seizures.  His wife has noted that he makes occasional paraphasic errors.  For example, his wife took their child to the pediatrician, and he asked "how was the visit to the hospital", and kept using hospital in place of pediatrician repeatedly.\par \par *** 11/30/2021  ***\par  Mr. NAVA returns for unscheduled visit as a result of recent recurrent seizure. Mr. NAVA described that he had a poor night's sleep, took his usual medications at 7 AM, went back to sleep around 9 AM, and woke up around 10:30 AM with blood in his mouth and tongue bitten.  His wife describes him as confused that morning.  He concludes that he must of had a seizure unwitnessed by his wife.\par \par  Mr. NAVA and his wife also shared that their current plan is for her to return to work in the summer 2022, and Mr. NAVA will take on full responsibilities as primary caretaker for his infant child.  They are feeling some pressure to have his seizures under good control prior to this transition.\par \par *** 10/05/2021  ***\par  Mr. NAVA returns for scheduled follow-up visit.  In the interval, he reports no recurrent seizures.  This follow-up was primarily arranged to assess for medication side effects. Mr. NAVA is taking levetiracetam  mg in the morning, 1000 mg at bedtime.  He and his spouse report no changes in irritability.  He reports that his mood is good.  He has a small infant at home, sleep has been patchy, but his seizure control has been adequate.\par \par *** 07/23/2021  ***\par Mr. NAVA had another seizure July 6 - occurred at 5a.  He has had only seizures out of sleep except last year when not taking medication, he had a seizure mid-afternoon. Mr. NAVA has new baby at home - and had been getting up overnight to give baby feeds. Irritability improved with change to ER.  \par \par ***UPDATE: 7/7/2021***\par Appointment was conducted by audiovisual/telehealth at request of patient in place of a follow up office visit due to heightened concern for Corona virus infection risk.\par Verbal consent given on Jul 08, 2021 2:18 PM by the patient Mr. ENRIQUE NAVA May 5 1980 who understands that the telephone visit will be charged to insurance and may involve co-pay for patient\par Nurse Practitioner location:office\par Patient location:home\par Individuals on call: CARL Khan, Mr. ENRIQUE NAVA\par \par Mr NELSON NAVA reports a recent seizure yesterday morning at 4:58 am. He took an extra dose of Levetiracetam ER 500mg\par Of note he and his wife just had a baby girl one month ago and he has been very sleep deprived which probably triggered recent episode\par Otherwise his mood is good and he has no side effects\par \par Levetiracetam ER 500mg BID\par \par *** 02/01/2021  ***\par Mr. NAVA had another convulsion that occurred in early morning, about 6a, normally takes meds at 7a/7p.  He does not think he missed any doses.  His spouse noted that 1 day prior to the conversion, he may have had a complex partial seizure.  He was sitting on the couch, was unresponsive for a short time and his wife noted he was staring, and not processing information.  Spouse has also been noticing more irritability and anger, and that Mr. NAVA has more difficulty letting go of anger, often anger seems unprompted. \par \par *** 10/16/2020  ***\par Mr. NAVA reports that he had an overnight convulsion - he had tapered off levetiracetam almost a year ago.  Mr. ANVA reports that he has been under increased stress and sleeping poorly.  He was re-started on levetiracetam 500 q12, and reports that he is tolerating it well. \par  \par *** 09/09/2019  ***\par Mr. NAVA returns for follow-up.  He has been taking levetiracetam 250 q12 for nearly 3 mo without recurrence of seizures.  He has one lifetime convulsion. However, day prior to convulsion he had multiple episodes of dizziness and GI upset lasting 1-3 minutes each. Mr. NAVA has not had recurrence of these symptoms since taking levetiracetam. He now returns with his spouse to discuss pros / cons \par \par *** 02/15/2019 ***\par Mr. ENRIQUE NAVA returns for scheduled follow-up appointment. Mr. NAVA reports that in the interval since his last visit, he is doing well. No interval seizures or auras.  Mr. NAVA endorses minimal irritability - not clearly different than baseline.  Mr. NAVA also endorses that he has been more physically active and wondered whether that would impact on seizure risk. \par \par *** 08/31/2018 *** \par On this visit, Mr. NAVA recalls that in June prior to seizure he had 4-5 episodes of 1-3 minutes of feeling dizzy, gesturing to stomach. Since last visit, Mr. NAVA has had no recurrent events. He continues to take levetiracetam 500 q12, spouse has noted minimal irritability, mostly when already frustrated. No change in mood. \par \par *** 7/6/2018 ***\par On 6/10/18 Mr. NAVA had a seizure out of sleep, had w/u at [facility] in [place]. Spouse awoke to Mr. NAVA screaming and stiffened - convulsing.  May have had another event on Jan 31- woke up with "black tongue" and bruising on back. It was painful, and had difficulty eating.  Felt like he was "out of it" at the appointment. Spoke to his wife and told her there was  lapse of time and he was confused at doctor appointment.  Not sleep deprived, may have had a few beers over the prior weekend. \par \par No febrile seizures, no CNS infections. Normal development and schooling.\par Normal pregnancy.  \par \par Meds - took levetiracetam 500 mg twice a day. \par \par PMH - borderline HTN\par PSH - none\par Social - master's in special ed, no tobb, occasional ETOH, no drugs. \par FH - nc\par ROS - none

## 2022-02-18 NOTE — REASON FOR VISIT
[Home] : at home, [unfilled] , at the time of the visit. [Medical Office: ([facility])___] : at the medical office located in  [Follow-Up: _____] : a [unfilled] follow-up visit

## 2022-10-14 ENCOUNTER — TRANSCRIPTION ENCOUNTER (OUTPATIENT)
Age: 42
End: 2022-10-14

## 2022-10-18 ENCOUNTER — TRANSCRIPTION ENCOUNTER (OUTPATIENT)
Age: 42
End: 2022-10-18

## 2022-10-28 ENCOUNTER — TRANSCRIPTION ENCOUNTER (OUTPATIENT)
Age: 42
End: 2022-10-28

## 2023-01-16 ENCOUNTER — RX RENEWAL (OUTPATIENT)
Age: 43
End: 2023-01-16

## 2023-01-23 ENCOUNTER — TRANSCRIPTION ENCOUNTER (OUTPATIENT)
Age: 43
End: 2023-01-23

## 2023-01-26 ENCOUNTER — TRANSCRIPTION ENCOUNTER (OUTPATIENT)
Age: 43
End: 2023-01-26

## 2023-03-05 ENCOUNTER — RX RENEWAL (OUTPATIENT)
Age: 43
End: 2023-03-05

## 2023-07-26 ENCOUNTER — TRANSCRIPTION ENCOUNTER (OUTPATIENT)
Age: 43
End: 2023-07-26

## 2023-08-15 ENCOUNTER — APPOINTMENT (OUTPATIENT)
Dept: NEUROLOGY | Facility: CLINIC | Age: 43
End: 2023-08-15
Payer: COMMERCIAL

## 2023-08-15 PROCEDURE — 99215 OFFICE O/P EST HI 40 MIN: CPT | Mod: 95

## 2023-08-15 NOTE — PHYSICAL EXAM
[FreeTextEntry1] : Alert and oriented x 3, speech fluent, names easily, follows requests, good recall for recent and remote events. EOM full without sustained nystagmus, PERRL, face symmetrical, no dysarthria Motor - symmetric strength. normal rapid-alternating movements. Sensory - intact LT bilaterally Coord - no tremor, ataxia Gait - stands without difficulty, normal gait.

## 2023-08-15 NOTE — HISTORY OF PRESENT ILLNESS
[Home] : at home, [unfilled] , at the time of the visit. [Medical Office: ([facility])___] : at the medical office located in  [Spouse] : spouse [Verbal consent obtained from patient] : the patient, [unfilled] [FreeTextEntry1] : *** 08/15/2023  ***  Mr. NAVA returns for regular follow-up.  He reports that in the interval, he has not had any seizures.  He continues taking levetiracetam ER 1000 mg twice a Day.  His wife notes persistent increased irritability, increased strength of emotional reactions, compared to before starting levetiracetam. Mr. NAVA is interested in finding out about alternative to levetiracetam.  He is otherwise doing well.  He has a daughter who is approximately 20 months old, and is expecting another child in early September.  *** 02/18/2022  *** Mr. Nava returns for regular follow-up.  He reports that he is doing well on Keppra ER 1000 mg twice a day.  He has had no interval seizures.  His wife has noted that he makes occasional paraphasic errors.  For example, his wife took their child to the pediatrician, and he asked "how was the visit to the hospital", and kept using hospital in place of pediatrician repeatedly.  *** 11/30/2021  ***  Mr. NAVA returns for unscheduled visit as a result of recent recurrent seizure. Mr. NAVA described that he had a poor night's sleep, took his usual medications at 7 AM, went back to sleep around 9 AM, and woke up around 10:30 AM with blood in his mouth and tongue bitten.  His wife describes him as confused that morning.  He concludes that he must of had a seizure unwitnessed by his wife.   Mr. NAVA and his wife also shared that their current plan is for her to return to work in the summer 2022, and Mr. NAVA will take on full responsibilities as primary caretaker for his infant child.  They are feeling some pressure to have his seizures under good control prior to this transition.  *** 10/05/2021  ***  Mr. NAVA returns for scheduled follow-up visit.  In the interval, he reports no recurrent seizures.  This follow-up was primarily arranged to assess for medication side effects. Mr. NAVA is taking levetiracetam  mg in the morning, 1000 mg at bedtime.  He and his spouse report no changes in irritability.  He reports that his mood is good.  He has a small infant at home, sleep has been patchy, but his seizure control has been adequate.  *** 07/23/2021  *** Mr. NAVA had another seizure July 6 - occurred at 5a.  He has had only seizures out of sleep except last year when not taking medication, he had a seizure mid-afternoon. Mr. NAVA has new baby at home - and had been getting up overnight to give baby feeds. Irritability improved with change to ER.    ***UPDATE: 7/7/2021*** Appointment was conducted by audiovisual/telehealth at request of patient in place of a follow up office visit due to heightened concern for Corona virus infection risk. Verbal consent given on Jul 08, 2021 2:18 PM by the patient Mr. ENRIQUE NAVA May 5 1980 who understands that the telephone visit will be charged to insurance and may involve co-pay for patient Nurse Practitioner location:office Patient location:home Individuals on call: CARL Khan, Mr. ENRIQUE NAVA reports a recent seizure yesterday morning at 4:58 am. He took an extra dose of Levetiracetam ER 500mg Of note he and his wife just had a baby girl one month ago and he has been very sleep deprived which probably triggered recent episode Otherwise his mood is good and he has no side effects  Levetiracetam ER 500mg BID  *** 02/01/2021  *** Mr. NAVA had another convulsion that occurred in early morning, about 6a, normally takes meds at 7a/7p.  He does not think he missed any doses.  His spouse noted that 1 day prior to the conversion, he may have had a complex partial seizure.  He was sitting on the couch, was unresponsive for a short time and his wife noted he was staring, and not processing information.  Spouse has also been noticing more irritability and anger, and that Mr. NAVA has more difficulty letting go of anger, often anger seems unprompted.   *** 10/16/2020  *** Mr. NAVA reports that he had an overnight convulsion - he had tapered off levetiracetam almost a year ago.  Mr. NAVA reports that he has been under increased stress and sleeping poorly.  He was re-started on levetiracetam 500 q12, and reports that he is tolerating it well.    *** 09/09/2019  *** Mr. NAVA returns for follow-up.  He has been taking levetiracetam 250 q12 for nearly 3 mo without recurrence of seizures.  He has one lifetime convulsion. However, day prior to convulsion he had multiple episodes of dizziness and GI upset lasting 1-3 minutes each. Mr. NAVA has not had recurrence of these symptoms since taking levetiracetam. He now returns with his spouse to discuss pros / cons   *** 02/15/2019 *** Mr. ENRIQUE NAVA returns for scheduled follow-up appointment. Mr. NAVA reports that in the interval since his last visit, he is doing well. No interval seizures or auras.  Mr. NAVA endorses minimal irritability - not clearly different than baseline.  Mr. NAVA also endorses that he has been more physically active and wondered whether that would impact on seizure risk.   *** 08/31/2018 ***  On this visit, Mr. NAVA recalls that in June prior to seizure he had 4-5 episodes of 1-3 minutes of feeling dizzy, gesturing to stomach. Since last visit, Mr. NAVA has had no recurrent events. He continues to take levetiracetam 500 q12, spouse has noted minimal irritability, mostly when already frustrated. No change in mood.   *** 7/6/2018 *** On 6/10/18 Mr. NAVA had a seizure out of sleep, had w/u at [facility] in [place]. Spouse awoke to Mr. NAVA screaming and stiffened - convulsing.  May have had another event on Jan 31- woke up with "black tongue" and bruising on back. It was painful, and had difficulty eating.  Felt like he was "out of it" at the appointment. Spoke to his wife and told her there was  lapse of time and he was confused at doctor appointment.  Not sleep deprived, may have had a few beers over the prior weekend.   No febrile seizures, no CNS infections. Normal development and schooling. Normal pregnancy.    Meds - took levetiracetam 500 mg twice a day.   PMH - borderline HTN PSH - none Social - master's in special ed, no tobb, occasional ETOH, no drugs.  FH - nc ROS - none

## 2023-08-15 NOTE — ASSESSMENT
[FreeTextEntry1] : Mr. NAVA most recent seizure was 2021, and consisted of a convulsion from sleep while on levetiracetam.  Prior to this he had a convulsion in October in the setting of sleep deprivation after nearly 1 year seizure-free.  Prior to this, he had one clear convulsion from [place] 10 2019, and one very likely convulsion (bitten tongue, bruised back and shoulder) from 2019.  EEG showed L temporal slowing. MRI was unremarkable.  Based on age - primary generalized seizure is unlikely, the occurrence of seizure out of sleep or possibly early morning raises possibility of frontal lobe epilepsy. Spouse describes event suggestive of partial seizure evening before nocturnal convulsion out of sleep.  Mr. NAVA does not drive - he lives in [place] and does not use car.   Plan: 1.  After last convulsion, levetiracetam was changed to extended release 750 mg twice a day.  He is currently taking levetiracetam ER 1000 mg twice a day 2.  Irritabilty improved but did not resolve on switch to ER. Mr. NAVA did not want to make a change to new anticonvulsant at this time, but will consider a few months after his  arrives. 3.  Follow-up televisit in 5 month to reassess levetiracetam side effects and consider switch to lamotrigine.  We discussed potential lamotrigine side effects at some length.  I have spent 40 minutes or longer reviewing patient data or discussing with the patient  the cause of seizures or seizure-like events and comorbid conditions, assessing the risk of recurrence, educating the patient or family to recognize seizures, and discussing possible treatment options for seizures and comorbid conditions and possible side effects of medications. I also discussed seizure safety, and ways of reducing seizure risk. Greater than 50% of the encounter time was spent on counseling and coordination of care for reviewing records in [employer], discussion with patient regarding plan.

## 2023-08-28 ENCOUNTER — TRANSCRIPTION ENCOUNTER (OUTPATIENT)
Age: 43
End: 2023-08-28

## 2023-08-30 ENCOUNTER — TRANSCRIPTION ENCOUNTER (OUTPATIENT)
Age: 43
End: 2023-08-30

## 2023-08-31 ENCOUNTER — TRANSCRIPTION ENCOUNTER (OUTPATIENT)
Age: 43
End: 2023-08-31

## 2023-10-17 ENCOUNTER — TRANSCRIPTION ENCOUNTER (OUTPATIENT)
Age: 43
End: 2023-10-17

## 2023-10-18 ENCOUNTER — APPOINTMENT (OUTPATIENT)
Dept: NEUROLOGY | Facility: CLINIC | Age: 43
End: 2023-10-18
Payer: COMMERCIAL

## 2023-10-18 ENCOUNTER — TRANSCRIPTION ENCOUNTER (OUTPATIENT)
Age: 43
End: 2023-10-18

## 2023-10-18 PROCEDURE — 99214 OFFICE O/P EST MOD 30 MIN: CPT | Mod: 95

## 2024-03-05 ENCOUNTER — TRANSCRIPTION ENCOUNTER (OUTPATIENT)
Age: 44
End: 2024-03-05

## 2024-04-22 ENCOUNTER — TRANSCRIPTION ENCOUNTER (OUTPATIENT)
Age: 44
End: 2024-04-22

## 2024-05-03 ENCOUNTER — APPOINTMENT (OUTPATIENT)
Dept: NEUROLOGY | Facility: CLINIC | Age: 44
End: 2024-05-03
Payer: COMMERCIAL

## 2024-05-03 DIAGNOSIS — G40.109 LOCALIZATION-RELATED (FOCAL) (PARTIAL) SYMPTOMATIC EPILEPSY AND EPILEPTIC SYNDROMES WITH SIMPLE PARTIAL SEIZURES, NOT INTRACTABLE, W/OUT STATUS EPILEPTICUS: ICD-10-CM

## 2024-05-03 PROCEDURE — 99215 OFFICE O/P EST HI 40 MIN: CPT

## 2024-05-03 RX ORDER — LACOSAMIDE 50 MG/1
50 TABLET ORAL
Qty: 240 | Refills: 1 | Status: ACTIVE | COMMUNITY
Start: 2024-05-03 | End: 1900-01-01

## 2024-05-03 RX ORDER — LEVETIRACETAM 500 MG/1
500 TABLET, FILM COATED, EXTENDED RELEASE ORAL
Qty: 360 | Refills: 1 | Status: ACTIVE | COMMUNITY
Start: 2022-01-10 | End: 1900-01-01

## 2024-05-06 NOTE — PHYSICAL EXAM
[FreeTextEntry1] : alert and oriented x 3, speech fluent, names easily, follows requests, good recall for recent and remote events.\par  EOM full without sustained nystagmus, PERRL, intact LT V1-V3 bilaterally, face symmetrical, no dysarthria, tongue midline, normal shoulder elevation.\par  Motor - normal motion in upper extremities bilaterally. normal rapid-alternating movements, no drift\par  Sensory - intact LT x 4 ext\par  Coord - no tremor, ataxia\par  Gait - stands without difficulty

## 2024-05-06 NOTE — HISTORY OF PRESENT ILLNESS
[FreeTextEntry1] : *** 05/03/2024  *** No interval seizures.  Mr. NAVA is interested in changing ASM from levetiracetam to lacosamide out of concern for increased irritability.     *** 10/18/2023  *** Had seizure last week, has been getting 3-5 sleep at night, and Mr. NAVA was getting no more than 2 hours at a stretch.  He's been getting more sleep since the seizure.  Seizure as similar to prior seizures, characterized by ictal cry and brief convulsion, postictal for a while, and then awoke disoriented. Last seizure occurred a month after birth of first child, and then again 5 mo later.   *** 08/15/2023  ***  Mr. NAVA returns for regular follow-up.  He reports that in the interval, he has not had any seizures.  He continues taking levetiracetam ER 1000 mg twice a Day.  His wife notes persistent increased irritability, increased strength of emotional reactions, compared to before starting levetiracetam. Mr. NAVA is interested in finding out about alternative to levetiracetam.  He is otherwise doing well.  He has a daughter who is approximately 20 months old, and is expecting another child in early September.  *** 02/18/2022  *** Mr. Nava returns for regular follow-up.  He reports that he is doing well on Keppra ER 1000 mg twice a day.  He has had no interval seizures.  His wife has noted that he makes occasional paraphasic errors.  For example, his wife took their child to the pediatrician, and he asked "how was the visit to the hospital", and kept using hospital in place of pediatrician repeatedly.  *** 11/30/2021  ***  Mr. NAVA returns for unscheduled visit as a result of recent recurrent seizure. Mr. NAVA described that he had a poor night's sleep, took his usual medications at 7 AM, went back to sleep around 9 AM, and woke up around 10:30 AM with blood in his mouth and tongue bitten.  His wife describes him as confused that morning.  He concludes that he must of had a seizure unwitnessed by his wife.   Mr. NAVA and his wife also shared that their current plan is for her to return to work in the summer 2022, and Mr. NAVA will take on full responsibilities as primary caretaker for his infant child.  They are feeling some pressure to have his seizures under good control prior to this transition.  *** 10/05/2021  ***  Mr. NAVA returns for scheduled follow-up visit.  In the interval, he reports no recurrent seizures.  This follow-up was primarily arranged to assess for medication side effects. Mr. NAVA is taking levetiracetam  mg in the morning, 1000 mg at bedtime.  He and his spouse report no changes in irritability.  He reports that his mood is good.  He has a small infant at home, sleep has been patchy, but his seizure control has been adequate.  *** 07/23/2021  *** Mr. NAVA had another seizure July 6 - occurred at 5a.  He has had only seizures out of sleep except last year when not taking medication, he had a seizure mid-afternoon. Mr. NAVA has new baby at home - and had been getting up overnight to give baby feeds. Irritability improved with change to ER.    ***UPDATE: 7/7/2021*** Appointment was conducted by audiovisual/telehealth at request of patient in place of a follow up office visit due to heightened concern for Corona virus infection risk. Verbal consent given on Jul 08, 2021 2:18 PM by the patient Mr. ENRIQUE NAVA May 5 1980 who understands that the telephone visit will be charged to insurance and may involve co-pay for patient Nurse Practitioner location:office Patient location:home Individuals on call: CARL Khan, Mr. ENRIQUE NAVA reports a recent seizure yesterday morning at 4:58 am. He took an extra dose of Levetiracetam ER 500mg Of note he and his wife just had a baby girl one month ago and he has been very sleep deprived which probably triggered recent episode Otherwise his mood is good and he has no side effects  Levetiracetam ER 500mg BID  *** 02/01/2021  *** Mr. NAVA had another convulsion that occurred in early morning, about 6a, normally takes meds at 7a/7p.  He does not think he missed any doses.  His spouse noted that 1 day prior to the conversion, he may have had a complex partial seizure.  He was sitting on the couch, was unresponsive for a short time and his wife noted he was staring, and not processing information.  Spouse has also been noticing more irritability and anger, and that Mr. NAVA has more difficulty letting go of anger, often anger seems unprompted.   *** 10/16/2020  *** Mr. NAVA reports that he had an overnight convulsion - he had tapered off levetiracetam almost a year ago.  Mr. NAVA reports that he has been under increased stress and sleeping poorly.  He was re-started on levetiracetam 500 q12, and reports that he is tolerating it well.    *** 09/09/2019  *** Mr. NAVA returns for follow-up.  He has been taking levetiracetam 250 q12 for nearly 3 mo without recurrence of seizures.  He has one lifetime convulsion. However, day prior to convulsion he had multiple episodes of dizziness and GI upset lasting 1-3 minutes each. Mr. NAVA has not had recurrence of these symptoms since taking levetiracetam. He now returns with his spouse to discuss pros / cons   *** 02/15/2019 *** Mr. ENRIQUE NAVA returns for scheduled follow-up appointment. Mr. NAVA reports that in the interval since his last visit, he is doing well. No interval seizures or auras.  Mr. NAVA endorses minimal irritability - not clearly different than baseline.  Mr. NAVA also endorses that he has been more physically active and wondered whether that would impact on seizure risk.   *** 08/31/2018 ***  On this visit, Mr. NAVA recalls that in June prior to seizure he had 4-5 episodes of 1-3 minutes of feeling dizzy, gesturing to stomach. Since last visit, Mr. NAVA has had no recurrent events. He continues to take levetiracetam 500 q12, spouse has noted minimal irritability, mostly when already frustrated. No change in mood.   *** 7/6/2018 *** On 6/10/18 Mr. NAVA had a seizure out of sleep, had w/u at [facility] in [place]. Spouse awoke to Mr. NAVA screaming and stiffened - convulsing.  May have had another event on Jan 31- woke up with "black tongue" and bruising on back. It was painful, and had difficulty eating.  Felt like he was "out of it" at the appointment. Spoke to his wife and told her there was  lapse of time and he was confused at doctor appointment.  Not sleep deprived, may have had a few beers over the prior weekend.   No febrile seizures, no CNS infections. Normal development and schooling. Normal pregnancy.    Meds - took levetiracetam 500 mg twice a day.   PMH - borderline HTN PSH - none Social - master's in special ed, no tobb, occasional ETOH, no drugs.  FH - nc ROS - none

## 2024-05-06 NOTE — ASSESSMENT
[FreeTextEntry1] : Mr. NAVA has history of convulsion from sleep while on levetiracetam. He has had rare seizures 2/2021 (on levetiracetam) and seizure Oct 2020 after begin seizure free off ASM for 1 year.  Prior to this, he had one clear convulsion from [place] 10 2019, and one very likely convulsion (bitten tongue, bruised back and shoulder) from Jan 31 2019.  EEG showed L temporal slowing. MRI was unremarkable.  Based on age - primary generalized seizure is unlikely, the occurrence of seizure out of sleep or possibly early morning raises possibility of frontal lobe epilepsy. Spouse describes event suggestive of partial seizure evening before nocturnal convulsion out of sleep.  Mr. NAVA does not drive - he lives in [place] and does not use car. He has been taking levetiracetam 500/1000 ER for better antiseizure protection overnight.   I've answered Mr. NAVA's and his spouses' questions to the best of my ability.   Plan: 1.  start lacosamide and titrate to 150mg q12, then taper off levetiracetam.  2.  Follow-up televisit in 3 month  3. will call for any probelms.   I have spent 40 minutes or longer reviewing patient data or discussing with the patient  the cause of seizures or seizure-like events and comorbid conditions, assessing the risk of recurrence, educating the patient or family to recognize seizures, and discussing possible treatment options for seizures and comorbid conditions and possible side effects of medications. I also discussed seizure safety, and ways of reducing seizure risk. Greater than 50% of the encounter time was spent on counseling and coordination of care for reviewing records in [employer], discussion with patient regarding plan.

## 2024-05-08 ENCOUNTER — TRANSCRIPTION ENCOUNTER (OUTPATIENT)
Age: 44
End: 2024-05-08

## 2024-05-15 ENCOUNTER — TRANSCRIPTION ENCOUNTER (OUTPATIENT)
Age: 44
End: 2024-05-15

## 2024-05-16 ENCOUNTER — TRANSCRIPTION ENCOUNTER (OUTPATIENT)
Age: 44
End: 2024-05-16

## 2024-05-21 ENCOUNTER — TRANSCRIPTION ENCOUNTER (OUTPATIENT)
Age: 44
End: 2024-05-21

## 2024-07-10 ENCOUNTER — TRANSCRIPTION ENCOUNTER (OUTPATIENT)
Age: 44
End: 2024-07-10

## 2024-07-11 ENCOUNTER — TRANSCRIPTION ENCOUNTER (OUTPATIENT)
Age: 44
End: 2024-07-11

## 2024-07-11 RX ORDER — LACOSAMIDE 200 MG/1
200 TABLET ORAL
Qty: 270 | Refills: 0 | Status: ACTIVE | COMMUNITY
Start: 2024-07-10 | End: 1900-01-01

## 2024-07-27 ENCOUNTER — TRANSCRIPTION ENCOUNTER (OUTPATIENT)
Age: 44
End: 2024-07-27

## 2024-08-09 ENCOUNTER — RX RENEWAL (OUTPATIENT)
Age: 44
End: 2024-08-09

## 2024-08-14 ENCOUNTER — TRANSCRIPTION ENCOUNTER (OUTPATIENT)
Age: 44
End: 2024-08-14

## 2024-08-27 ENCOUNTER — TRANSCRIPTION ENCOUNTER (OUTPATIENT)
Age: 44
End: 2024-08-27

## 2024-08-28 ENCOUNTER — TRANSCRIPTION ENCOUNTER (OUTPATIENT)
Age: 44
End: 2024-08-28

## 2024-08-30 ENCOUNTER — TRANSCRIPTION ENCOUNTER (OUTPATIENT)
Age: 44
End: 2024-08-30

## 2024-09-03 ENCOUNTER — APPOINTMENT (OUTPATIENT)
Dept: NEUROLOGY | Facility: CLINIC | Age: 44
End: 2024-09-03
Payer: COMMERCIAL

## 2024-09-03 DIAGNOSIS — G40.109 LOCALIZATION-RELATED (FOCAL) (PARTIAL) SYMPTOMATIC EPILEPSY AND EPILEPTIC SYNDROMES WITH SIMPLE PARTIAL SEIZURES, NOT INTRACTABLE, W/OUT STATUS EPILEPTICUS: ICD-10-CM

## 2024-09-03 DIAGNOSIS — R20.2 PARESTHESIA OF SKIN: ICD-10-CM

## 2024-09-03 DIAGNOSIS — R41.3 OTHER AMNESIA: ICD-10-CM

## 2024-09-03 PROCEDURE — 99215 OFFICE O/P EST HI 40 MIN: CPT

## 2024-09-03 NOTE — HISTORY OF PRESENT ILLNESS
[FreeTextEntry1] : *** 09/03/2024  *** No interval seizures.  In past seizures have often occurred in setting of sleep deprivation, Mr. NAVA feels that he sleep currently is reliably 6-8 hours - fewer sleepless nights. He has transitioned off of levetiracetam and onto lacosamide 200 q12. Spouse notes improvement in mood. Patient reporting increased twitching and increased occurrence of pins/needles sensations during the day - triggered by pushing stroller or playing with his children. Patient and spouse have been frustrated in communication with office.  Labs from Aug 2024 reviewed - lacosamide level 8.2 (5-10), CBC and CMP normal.   Mr. NAVA and spouse also note increased difficulty with memory - some difficulties calling up correct name of objects, some difficulty recalling long-remembered phone numbers. These difficulties predated change to lacosamdie.  *** 5/3/2024 *** No interval seizures.  Mr. NAVA is interested in changing ASM from levetiracetam to lacosamide out of concern for increased irritability.     *** 10/18/2023  *** Had seizure last week, has been getting 3-5 sleep at night, and Mr. NAVA was getting no more than 2 hours at a stretch.  He's been getting more sleep since the seizure.  Seizure as similar to prior seizures, characterized by ictal cry and brief convulsion, postictal for a while, and then awoke disoriented. Last seizure occurred a month after birth of first child, and then again 5 mo later.   *** 08/15/2023  ***  Mr. NAVA returns for regular follow-up.  He reports that in the interval, he has not had any seizures.  He continues taking levetiracetam ER 1000 mg twice a Day.  His wife notes persistent increased irritability, increased strength of emotional reactions, compared to before starting levetiracetam. Mr. NAVA is interested in finding out about alternative to levetiracetam.  He is otherwise doing well.  He has a daughter who is approximately 20 months old, and is expecting another child in early September.  *** 02/18/2022  *** Mr. Nava returns for regular follow-up.  He reports that he is doing well on Keppra ER 1000 mg twice a day.  He has had no interval seizures.  His wife has noted that he makes occasional paraphasic errors.  For example, his wife took their child to the pediatrician, and he asked "how was the visit to the hospital", and kept using hospital in place of pediatrician repeatedly.  *** 11/30/2021  ***  Mr. NAVA returns for unscheduled visit as a result of recent recurrent seizure. Mr. NAVA described that he had a poor night's sleep, took his usual medications at 7 AM, went back to sleep around 9 AM, and woke up around 10:30 AM with blood in his mouth and tongue bitten.  His wife describes him as confused that morning.  He concludes that he must of had a seizure unwitnessed by his wife.   Mr. NAVA and his wife also shared that their current plan is for her to return to work in the summer 2022, and Mr. NAVA will take on full responsibilities as primary caretaker for his infant child.  They are feeling some pressure to have his seizures under good control prior to this transition.  *** 10/05/2021  ***  Mr. NAVA returns for scheduled follow-up visit.  In the interval, he reports no recurrent seizures.  This follow-up was primarily arranged to assess for medication side effects. Mr. NAVA is taking levetiracetam  mg in the morning, 1000 mg at bedtime.  He and his spouse report no changes in irritability.  He reports that his mood is good.  He has a small infant at home, sleep has been patchy, but his seizure control has been adequate.  *** 07/23/2021  *** Mr. NAVA had another seizure July 6 - occurred at 5a.  He has had only seizures out of sleep except last year when not taking medication, he had a seizure mid-afternoon. Mr. NAVA has new baby at home - and had been getting up overnight to give baby feeds. Irritability improved with change to ER.    ***UPDATE: 7/7/2021*** Appointment was conducted by audiovisual/telehealth at request of patient in place of a follow up office visit due to heightened concern for Corona virus infection risk. Verbal consent given on Jul 08, 2021 2:18 PM by the patient Mr. ENRIQUE NAVA May 5 1980 who understands that the telephone visit will be charged to insurance and may involve co-pay for patient Nurse Practitioner location:office Patient location:home Individuals on call: CARL Khan, Mr. ENRIQUE NAVA reports a recent seizure yesterday morning at 4:58 am. He took an extra dose of Levetiracetam ER 500mg Of note he and his wife just had a baby girl one month ago and he has been very sleep deprived which probably triggered recent episode Otherwise his mood is good and he has no side effects  Levetiracetam ER 500mg BID  *** 02/01/2021  *** Mr. NAVA had another convulsion that occurred in early morning, about 6a, normally takes meds at 7a/7p.  He does not think he missed any doses.  His spouse noted that 1 day prior to the conversion, he may have had a complex partial seizure.  He was sitting on the couch, was unresponsive for a short time and his wife noted he was staring, and not processing information.  Spouse has also been noticing more irritability and anger, and that Mr. NAVA has more difficulty letting go of anger, often anger seems unprompted.   *** 10/16/2020  *** Mr. NAVA reports that he had an overnight convulsion - he had tapered off levetiracetam almost a year ago.  Mr. NAVA reports that he has been under increased stress and sleeping poorly.  He was re-started on levetiracetam 500 q12, and reports that he is tolerating it well.    *** 09/09/2019  *** Mr. NAVA returns for follow-up.  He has been taking levetiracetam 250 q12 for nearly 3 mo without recurrence of seizures.  He has one lifetime convulsion. However, day prior to convulsion he had multiple episodes of dizziness and GI upset lasting 1-3 minutes each. Mr. NAVA has not had recurrence of these symptoms since taking levetiracetam. He now returns with his spouse to discuss pros / cons   *** 02/15/2019 *** Mr. ENRIQUE NAVA returns for scheduled follow-up appointment. Mr. NAVA reports that in the interval since his last visit, he is doing well. No interval seizures or auras.  Mr. NAVA endorses minimal irritability - not clearly different than baseline.  Mr. NAVA also endorses that he has been more physically active and wondered whether that would impact on seizure risk.   *** 08/31/2018 ***  On this visit, Mr. NAVA recalls that in June prior to seizure he had 4-5 episodes of 1-3 minutes of feeling dizzy, gesturing to stomach. Since last visit, Mr. NAVA has had no recurrent events. He continues to take levetiracetam 500 q12, spouse has noted minimal irritability, mostly when already frustrated. No change in mood.   *** 7/6/2018 *** On 6/10/18 Mr. NAVA had a seizure out of sleep, had w/u at [facility] in [place]. Spouse awoke to Mr. NAVA screaming and stiffened - convulsing.  May have had another event on Jan 31- woke up with "black tongue" and bruising on back. It was painful, and had difficulty eating.  Felt like he was "out of it" at the appointment. Spoke to his wife and told her there was  lapse of time and he was confused at doctor appointment.  Not sleep deprived, may have had a few beers over the prior weekend.   No febrile seizures, no CNS infections. Normal development and schooling. Normal pregnancy.    Meds - took levetiracetam 500 mg twice a day.   PMH - borderline HTN PSH - none Social - master's in special ed, no tobb, occasional ETOH, no drugs.  FH - nc ROS - none

## 2024-09-03 NOTE — ASSESSMENT
[FreeTextEntry1] : Mr. NAVA has history of convulsion from sleep while on levetiracetam. He has had rare seizures 2/2021 (on levetiracetam) and seizure Oct 2020 after begin seizure free off ASM for 1 year. Prior to this, he had one clear convulsion from [place] 10 2019, and one very likely convulsion (bitten tongue, bruised back and shoulder) from Jan 31 2019. EEG showed L temporal slowing. MRI was unremarkable. Based on age - primary generalized seizure is unlikely, the occurrence of seizure out of sleep or possibly early morning raises possibility of frontal lobe epilepsy. Spouse describes event suggestive of partial seizure evening before nocturnal convulsion out of sleep. Mr. NAVA does not drive - he lives in [place] and does not use car. He has been taking levetiracetam 500/1000 ER for better antiseizure protection overnight. I've answered Mr. NAVA's and his spouses' questions to the best of my ability.  Plan: 1. reduce lacosamide 150/200 - will assess whether paresthesia and twitches improve.  2. Follow-up televisit in 3 months 3. will ask CHEPE Vazquez to reach out to patient to improve communication plan.  4. consider neuropsychological testing if memory complaints continue  I have spent 40 minutes or longer reviewing patient data or discussing with the patient the cause of seizures or seizure-like events and comorbid conditions, assessing the risk of recurrence, educating the patient or family to recognize seizures, and discussing possible treatment options for seizures and comorbid conditions and possible side effects of medications. I also discussed seizure safety, and ways of reducing seizure risk. Greater than 50% of the encounter time was spent on counseling and coordination of care for reviewing records in [employer], discussion with patient regarding plan.

## 2024-12-02 ENCOUNTER — APPOINTMENT (OUTPATIENT)
Dept: NEUROLOGY | Facility: CLINIC | Age: 44
End: 2024-12-02
Payer: COMMERCIAL

## 2024-12-02 DIAGNOSIS — G40.109 LOCALIZATION-RELATED (FOCAL) (PARTIAL) SYMPTOMATIC EPILEPSY AND EPILEPTIC SYNDROMES WITH SIMPLE PARTIAL SEIZURES, NOT INTRACTABLE, W/OUT STATUS EPILEPTICUS: ICD-10-CM

## 2024-12-02 PROCEDURE — 99214 OFFICE O/P EST MOD 30 MIN: CPT | Mod: 95

## 2024-12-02 RX ORDER — LORAZEPAM 1 MG/1
1 TABLET ORAL
Qty: 15 | Refills: 0 | Status: ACTIVE | COMMUNITY
Start: 2024-12-02 | End: 1900-01-01

## 2024-12-10 ENCOUNTER — TRANSCRIPTION ENCOUNTER (OUTPATIENT)
Age: 44
End: 2024-12-10

## 2025-01-15 ENCOUNTER — TRANSCRIPTION ENCOUNTER (OUTPATIENT)
Age: 45
End: 2025-01-15

## 2025-03-03 ENCOUNTER — TRANSCRIPTION ENCOUNTER (OUTPATIENT)
Age: 45
End: 2025-03-03

## 2025-08-20 ENCOUNTER — TRANSCRIPTION ENCOUNTER (OUTPATIENT)
Age: 45
End: 2025-08-20